# Patient Record
Sex: FEMALE | Race: BLACK OR AFRICAN AMERICAN
[De-identification: names, ages, dates, MRNs, and addresses within clinical notes are randomized per-mention and may not be internally consistent; named-entity substitution may affect disease eponyms.]

---

## 2018-10-26 ENCOUNTER — HOSPITAL ENCOUNTER (OUTPATIENT)
Dept: HOSPITAL 47 - RADUSWWP | Age: 58
Discharge: HOME | End: 2018-10-26
Attending: INTERNAL MEDICINE
Payer: COMMERCIAL

## 2018-10-26 DIAGNOSIS — L92.8: Primary | ICD-10-CM

## 2018-10-26 DIAGNOSIS — R10.9: ICD-10-CM

## 2018-10-26 PROCEDURE — 76700 US EXAM ABDOM COMPLETE: CPT

## 2018-10-26 NOTE — US
EXAMINATION TYPE: US abdomen complete

 

DATE OF EXAM: 10/26/2018

 

COMPARISON: NONE

 

CLINICAL HISTORY: R10.9 ABDOMINAL PAIN. Bilateral mid abd pain x 3 months

 

EXAM MEASUREMENTS:

 

Liver Length:  14.9 cm   

Gallbladder Wall:  0.2 cm   

CBD:  0.6 cm

Spleen:  9.2 cm   

Right Kidney:  10.5 x 5.3 x 3.9 cm 

Left Kidney:  10.5 x 3.8 x 4.9 cm   

 

 

 

Pancreas:  primarily obscured by bowel gas

Liver:  no masses seen; scan intercostally  

Gallbladder:  wnl

**Evidence for sonographic Miguel's sign:  no

CBD:  wnl 

Spleen:  multiple small shadowing calcifications seen throughout with largest = 0.7 x 0.5 x 0.4cm    


Right Kidney:  No hydronephrosis or masses seen   

Left Kidney:  No hydronephrosis or masses seen   

Upper IVC:  wnl  

Abd Aorta:  wnl; prominent SHIRLEY as Right SHIRLEY size = 1.5 A/P cm and Left SHIRLEY = 1.5cm A/P 

 

 

 

 

IMPRESSION:

1. Granulomas of the spleen. Otherwise unremarkable study.

## 2018-12-04 ENCOUNTER — HOSPITAL ENCOUNTER (OUTPATIENT)
Dept: HOSPITAL 47 - RADCTMAIN | Age: 58
Discharge: HOME | End: 2018-12-04
Attending: INTERNAL MEDICINE
Payer: COMMERCIAL

## 2018-12-04 DIAGNOSIS — D73.89: ICD-10-CM

## 2018-12-04 DIAGNOSIS — K44.9: Primary | ICD-10-CM

## 2018-12-04 PROCEDURE — 74150 CT ABDOMEN W/O CONTRAST: CPT

## 2018-12-04 NOTE — CT
EXAMINATION TYPE: CT abdomen wo con

 

DATE OF EXAM: 12/4/2018

 

COMPARISON: Correlation ultrasound 10/26/2018

 

HISTORY: 58-year-old female Abdominal pain. Abnormal ultrasound.

 

TECHNIQUE: Contiguous axial scanning of the abdomen without IV contrast. Coronal and sagittal reconst
ructions performed.

 

CT DLP: 339.2 mGycm

Automated exposure control for dose reduction was used.

 

 

FINDINGS: 

The heart is upper limits of normal in size without pericardial effusion. Calcified granuloma in the 
right middle lobe. Lung bases are otherwise clear without pleural effusion.

 

Small hiatal hernia.

 

Liver measures upper limits of normal at 16.6 cm. Noncontrast appearance of the liver, gallbladder, a
drenal glands, kidneys, and pancreas show no gross abnormality. No nephrolithiasis or hydronephrosis 
seen.

 

Multiple calcified granulomas in the spleen.

 

Moderate stool throughout the colon. Normal appendix is visualized. No pericolonic inflammatory kimbrough
e seen in the upper abdomen.

 

No dilated small bowel, free fluid, or free air. Scattered small mesenteric lymph nodes.

 

Partially visualized lobulated soft tissue area within the upper right pelvis. The partially visualiz
ed portion measures up to 7.6 cm. Reference axial image 61.

 

Bones: Scattered degenerative disc disease. Facet arthropathy lower lumbar spine.

 

 

 

IMPRESSION: 

 

1. PRIOR GRANULOMATOUS DISEASE WITH A CALCIFIED GRANULOMA IN THE RIGHT MIDDLE LOBE AND MULTIPLE CALCI
FIED GRANULOMAS IN THE SPLEEN.

2. PARTIALLY VISUALIZED LOBULATED SOFT TISSUE IN THE UPPER RIGHT PELVIS. THE VISUALIZED PORTION MEASU
RES UP TO 7.6 CM. UNCERTAIN IF THIS REPRESENTS A BULKY FIBROID UTERUS, CLUSTERED BOWEL LOOPS, OR PELV
IC MASS. CONSIDER PELVIC ULTRASOUND AS AN INITIAL STEP IN FURTHER EVALUATION. CROSS-SECTIONAL EVALUAT
ION MAY BE INDICATED.

3. MODERATE STOOL BURDEN.

4. SMALL HIATAL HERNIA.

## 2018-12-18 ENCOUNTER — HOSPITAL ENCOUNTER (OUTPATIENT)
Dept: HOSPITAL 47 - RADUSWWP | Age: 58
Discharge: HOME | End: 2018-12-18
Attending: INTERNAL MEDICINE
Payer: COMMERCIAL

## 2018-12-18 DIAGNOSIS — D25.9: Primary | ICD-10-CM

## 2018-12-18 PROCEDURE — 76830 TRANSVAGINAL US NON-OB: CPT

## 2018-12-18 PROCEDURE — 76856 US EXAM PELVIC COMPLETE: CPT

## 2018-12-19 NOTE — US
EXAMINATION TYPE: US pelvis complete transvag

 

DATE OF EXAM: 12/18/2018

 

COMPARISON: Ultrasound February 29, 2012

 

CLINICAL HISTORY: R93.89 Abnormal CT.

 

TECHNIQUE:  Transvaginal (TV) and Transabdominal (TA) .  Transabdominal sonographic images of the pel
vis were acquired.  Transvaginal sonographic images were medically necessary to better assess the fol
lowing anatomy: uterus

 

 

 

EXAM MEASUREMENTS:

 

Uterus:  9.1 x 5.6 x 6.9 cm

Endometrial Stripe: unable to visualize due to multiple fibroids

Right Ovary: Obscured by overlying bowel gas

Left Ovary: Obscured by overlying bowel gas

 

 

 

1. Uterus:  Anteverted, fibroid uterus, difficult to assess where one fibroid ends and another begins
, largest believed to measure 5.1 x 3.4 x 4.3cm

2. Endometrium:  unable to visualize due to multiple fibroids

3. Right Ovary:  Obscured by overlying bowel gas

4. Left Ovary:  Obscured by overlying bowel gas

5. Bilateral Adnexa:  wnl

6. Posterior cul-de-sac:  wnl

 

Heterogeneous anteverted uterus with lobulation felt to reflect large fibroids is redemonstrated. The
re is somewhat poorly defined. Central endometrial stripe is not visualized. No free fluid in pelvic 
cul-de-sac.

 

IMPRESSION: Redemonstration of fibroid type uterus.

## 2020-01-31 ENCOUNTER — HOSPITAL ENCOUNTER (OUTPATIENT)
Dept: HOSPITAL 47 - RADXRMAIN | Age: 60
Discharge: HOME | End: 2020-01-31
Payer: COMMERCIAL

## 2020-01-31 DIAGNOSIS — S81.001A: ICD-10-CM

## 2020-01-31 DIAGNOSIS — M12.861: Primary | ICD-10-CM

## 2020-01-31 NOTE — XR
EXAMINATION TYPE: XR knee complete RT

 

DATE OF EXAM: 1/31/2020

 

CLINICAL HISTORY: Right knee pain

 

TECHNIQUE:  Three views of the right knee are obtained.

 

COMPARISON: None.

 

FINDINGS:  There is no acute fracture/dislocation evident in right knee.  The tri-compartment joint s
paces appear aligned.  Small osteophytes are seen in the patellofemoral compartment. The overlying so
ft tissue appears unremarkable.

 

IMPRESSION:  There is no acute fracture or dislocation in the right knee. Mild patellofemoral arthrop
athy.

## 2020-02-11 ENCOUNTER — HOSPITAL ENCOUNTER (OUTPATIENT)
Dept: HOSPITAL 47 - RADXRMAIN | Age: 60
Discharge: HOME | End: 2020-02-11
Payer: COMMERCIAL

## 2020-02-11 DIAGNOSIS — S80.01XD: Primary | ICD-10-CM

## 2020-02-11 NOTE — XR
EXAMINATION TYPE: XR knee complete RT

 

DATE OF EXAM: 2/11/2020

 

CLINICAL HISTORY: pain

 

TECHNIQUE:  Three views of the right knee are obtained.

 

COMPARISON: None.

 

FINDINGS:  There is no acute fracture/dislocation.  The tri-compartment joint spaces appear within no
rmal limits.  The overlying soft tissue appears unremarkable.

 

IMPRESSION:  There is no acute fracture or dislocation.ICD 10 NO FRACTURE, INITIAL EVALUATION

## 2020-02-25 ENCOUNTER — HOSPITAL ENCOUNTER (OUTPATIENT)
Dept: HOSPITAL 47 - RADMRIMAIN | Age: 60
Discharge: HOME | End: 2020-02-25
Payer: COMMERCIAL

## 2020-02-25 DIAGNOSIS — S81.001D: ICD-10-CM

## 2020-02-25 DIAGNOSIS — M17.11: ICD-10-CM

## 2020-02-25 DIAGNOSIS — S83.281A: Primary | ICD-10-CM

## 2020-02-25 NOTE — MR
EXAMINATION TYPE: MR knee RT wo con

 

DATE OF EXAM: 2/25/2020

 

COMPARISON: Plain film 2/11/2020

 

HISTORY: Contusion right knee

 

TECHNIQUE: Multiplanar, multisequence imaging of the right knee is performed without IV contrast.

 

FINDINGS:

 

MEDIAL MENISCUS: Linear increased signal present at the undersurface of the medial meniscus posterior
 horn extends the articular surface, sagittal image #10 and 9. Suspect a meniscal cyst is present ext
ending medially along the proximal tibial surface.

 

LATERAL MENISCUS: Posterior horn of the lateral meniscus shows linear increased signal at the undersu
rface extending from the posterior corner to the articular surface.

 

CRUCIATE LIGAMENTS: The anterior and posterior cruciate ligaments are intact and unremarkable.

 

COLLATERAL LIGAMENTS: The medial collateral ligament and lateral collateral ligament complex are inta
ct and unremarkable.

 

EXTENSOR MECHANISM: Visualized quadriceps and patellar tendons are intact.

 

EFFUSION:  Suprapatellar joint effusion is small.

 

POPLITEAL CYST:  No popliteal/baker cyst. There is a cluster of grapes T2 intense signal extending al
zandra the region posterior to the proximal fibula suggesting ganglion cyst formation.

 

TRICOMPARTMENT SPACES: Maintained

 

CARTILAGE: There is grade 2-3 chondromalacia at the posterior patella, rate 3 to grade IV chondromala
miryam at the medial aspect of the lateral femoral condyle, coronal image #10, sagittal image #19

 

BONE MARROW SIGNAL: No focal abnormal marrow signal is appreciated.

 

OTHER:  No additional significant abnormality is appreciated.

 

IMPRESSION: 

Tears of the posterior horns of the medial and lateral meniscus. Probable meniscal cyst medially, shannen
glion cyst formation. Osteoarthritis.

## 2021-04-02 ENCOUNTER — HOSPITAL ENCOUNTER (OUTPATIENT)
Dept: HOSPITAL 47 - RADECHMAIN | Age: 61
Discharge: HOME | End: 2021-04-02
Attending: NURSE PRACTITIONER
Payer: COMMERCIAL

## 2021-04-02 DIAGNOSIS — I08.1: Primary | ICD-10-CM

## 2021-04-02 DIAGNOSIS — R07.89: ICD-10-CM

## 2021-04-02 PROCEDURE — 93306 TTE W/DOPPLER COMPLETE: CPT

## 2021-04-03 NOTE — ECHOF
Referral Reason:R07.89 other chest pain



MEASUREMENTS

--------

HEIGHT: 172.7 cm

WEIGHT: 99.8 kg

BP: 

RVIDd:   3.3 cm     (< 3.3)

IVSd:   0.9 cm     (0.6 - 1.1)

LVIDd:   3.9 cm     (3.9 - 5.3)

LVPWd:   1.1 cm     (0.6 - 1.1)

IVSs:   1.5 cm

LVIDs:   1.6 cm

LVPWs:   1.7 cm

LAESV Index (A-L):   26.72 ml/m

Ao Diam:   2.9 cm     (2.0 - 3.7)

AV Cusp:   2.0 cm     (1.5 - 2.6)

LA Diam:   2.7 cm     (2.7 - 3.8)

MV EXCURSION:   18.395 mm     (> 18.000)

MV EF SLOPE:   182 mm/s     (70 - 150)

EPSS:   0.5 cm

MV E Carlos:   0.65 m/s

MV DecT:   176 ms

MV A Carlos:   0.60 m/s

MV E/A Ratio:   1.07 

RAP:   5.00 mmHg

RVSP:   11.51 mmHg







FINDINGS

--------

This was a technically good study.

The left ventricular size is normal.   Left ventricular wall thickness is normal.   Overall left vent
ricular systolic function is normal with, an EF between 55 - 60 %.   The diastolic filling pattern is
 normal for the age of the patient 8.10.

The right ventricle is mildly enlarged.

The left atrial size is normal.    Normal LA  size by volume 22+/-6 ml/m2.

The right atrial size is normal.

Interatrial and interventricular septum intact.

The aortic valve is trileaflet and appears structurally normal.

The mitral valve is normal.   Mild mitral regurgitation is present.

The tricuspid valve appears structurally normal.   Trace tricuspid regurgitation present.   Right melisa
tricular systolic pressure is normal at < 35 mmHg.

There is no pulmonic regurgitation present.

The aortic root size is normal.

Normal inferior vena cava with normal inspiratory collapse consistent with estimated right atrial pre
ssure of  5 mmHg.

There is no pericardial effusion.



CONCLUSIONS

--------

1. The left ventricular size is normal.

2. Left ventricular wall thickness is normal.

3. Overall left ventricular systolic function is normal with, an EF between 55 - 60 %.

4. The diastolic filling pattern is normal for the age of the patient 8.10

5. The right ventricle is mildly enlarged.

6. Mild mitral regurgitation is present.

7. Trace tricuspid regurgitation present.

8. There is no pericardial effusion.





SONOGRAPHER: Ciarra Sosa RDCS

## 2024-01-31 NOTE — CT
EXAMINATION TYPE: CT heart w calcium score

 

DATE OF EXAM: 1/31/2024

 

COMPARISON: None

 

HISTORY: Screening for cardiovascular disorder. 213.9

 

CT DLP: 111.20 mGycm

Automated exposure control for dose reduction was used.

 

CT CALCIUM SCORING

Coronary calcium is a marker for plaque (fatty deposits) in a blood vessel or atherosclerosis (harden
ing of the arteries).  The presence and amount of calcium detected in a coronary artery by the CT sca
n, indicates the presence and amount of atherosclerotic plaque.  These calcium deposits appear years 
before the development of heart disease symptoms such as chest pain and shortness of breath.

 

A calcium score is computed for each of the coronary arteries based upon the volume and density of th
e calcium deposits.  This can be referred to as your calcified plaque burden.  It does not correspond
 directly to the percentage of narrowing in the artery but does correlate with the severity of the un
derlying coronary atherosclerosis.

 

PROCEDURE

 

TECHNIQUE - Prospective Gating was used.  Slice thickness: 3mm.

Density threshold (HU): 130, Pixel threshold: 3, Algorithm: discrete.

 

RESULTS

 

Region:  LM

Calcium Score (Agatston): 0

Volume (mm3): 0

Mass (g): 0

 

Region:  RCA

Calcium Score (Agatston): 0

Volume (mm3): 0

Mass (g): 0

 

Region:  LAD

Calcium Score (Agatston): 0

Volume (mm3): 0

Mass (g): 0

 

Region:  CX

Calcium Score (Agatston): 0

Volume (mm3): 0

Mass (g): 0

 

Region:  PDA

Calcium Score (Agatston):  0

Volume (mm3):  0

Mass (g):  0

 

Total:

Calcium Score (Agatston):  0

Volume (mm3): 0

Mass (g):  0

 

TOTAL CALCIUM SCORE:  0

 

 

Calcified lymph nodes are seen throughout the mediastinum with right medial lobe calcified granuloma 
in the lungs. Scattered splenic granulomas also present, these findings compatible with chronic granu
lomatous disease.

 

IMPRESSION: 

 

Calcium Score:  0

 

Implication:  No identifiable plaque.

 

Risk of Coronary Artery Disease: Very low, generally less than 5%.

 

 

 

CALCIUM SCORE    IMPLICATION                                                                RISK OF C
ORONARY ARTERY DISEASE

0                                    No identifiable plaque                                          
          Very low, generally less than 5%

1-10                              Minimal identifiable plaque                                        
    Very unlikely, less than 10%

                         Definite, at least mild atherosclerotic plaque               Mild or m
inimal coronary narrowings likely

101-400                       Definite, at least moderate atherosclerotic plaque     Mild coronary ar
piper disease highly likely, significant narrowing possible

401 or Higher              Extensive atherosclerotic plaque                                  High lik
elihood of at least one significant coronary narrowing

## 2024-02-03 NOTE — MR
EXAMINATION TYPE: MR humerus RT wo con

 

DATE OF EXAM: 1/31/2024 4:43 PM

 

CLINICAL INDICATION:Female, 64 years old with history of M79.601 PAIN IN RIGHT ARM; , [facility]

Pain upper arm, occasionally goes into lower arm x9 months, No injury, X-ray done at [facility]

 

COMPARISON: None

 

TECHNIQUE: Multiplanar multi-sequence imaging was performed.

 No gadolinium given. 

IV Contrast: None.

 

FINDINGS:  

Soft tissues: The musculature does appear to be within normal limits.  The signal intensity is unrema
rkable. Rotator cuff appears grossly intact there is increased signal near the supraspinatus and infr
aspinatus insertion.. No organizing fluid collection or mass.

 

Osseous structures: The bone marrow signal intensity of the humerus is within normal limits.  Osteoar
thritic changes are noted with mild osteophyte formation of the glenoid. 

 

 

IMPRESSION

1.  No evidence for mass.

2.  Rotator cuff tendinosis with possible underlying tear of the supraspinatus near its insertion. Ev
aluation limited due to technique. There is concern for rotator cuff pathology consider dedicated MRI
 shoulder.

## 2025-03-24 NOTE — US
EXAMINATION TYPE: US venous doppler duplex UE BI

 

DATE OF EXAM: 3/24/2025

 

COMPARISON: NONE

 

CLINICAL INDICATION: Female, 65 years old with history of M79.621 PAIN IN RIGHT UPPER ARM; Patient st
arted PT 2 weeks ago and upper arms hurt, no swelling

 

TECHNIQUE: Grayscale, color Doppler and spectral Doppler imaging of the upper extremity.

 

SIDE PERFORMED: Bilateral

 

VESSELS IMAGED:

IJV

Subclavian Vein

Axilla Vein

Brachial Vein(s)

Radial Paired Veins

Ulnar Paired Veins

Cephalic Vein*

Basilic Vein*

(*superficial vessels)

 

FINDINGS:

 

 

 

Right Arm: Negative for DVT

 

Left Arm: Negative for DVT

 

Grayscale, color doppler, spectral doppler imaging performed of the deep veins of the upper extremiti
es.  

 

IMPRESSION: NO ULTRASOUND EVIDENCE FOR ACUTE DEEP OR SUPERFICIAL VENOUS THROMBOSIS IN EITHER UPPER EX
TREMITY.

 

[facility], Workstation: [id number], 3/24/2025 4:25 PM